# Patient Record
Sex: FEMALE | Race: BLACK OR AFRICAN AMERICAN | NOT HISPANIC OR LATINO | URBAN - METROPOLITAN AREA
[De-identification: names, ages, dates, MRNs, and addresses within clinical notes are randomized per-mention and may not be internally consistent; named-entity substitution may affect disease eponyms.]

---

## 2018-02-27 ENCOUNTER — HISTORICAL (OUTPATIENT)
Dept: ADMINISTRATIVE | Facility: HOSPITAL | Age: 18
End: 2018-02-27

## 2018-09-13 ENCOUNTER — HISTORICAL (OUTPATIENT)
Dept: ADMINISTRATIVE | Facility: HOSPITAL | Age: 18
End: 2018-09-13

## 2018-09-13 LAB
ABS NEUT (OLG): 4.18 X10(3)/MCL (ref 2.1–9.2)
ALBUMIN SERPL-MCNC: 3.2 GM/DL (ref 3.4–5)
ALBUMIN/GLOB SERPL: 0.8 RATIO (ref 1.1–2)
ALP SERPL-CCNC: 39 UNIT/L (ref 38–126)
ALT SERPL-CCNC: 15 UNIT/L (ref 8–29)
AST SERPL-CCNC: 18 UNIT/L (ref 15–37)
B-HCG FREE SERPL-ACNC: <1 MIU/ML
BASOPHILS # BLD AUTO: 0 X10(3)/MCL (ref 0–0.2)
BASOPHILS NFR BLD AUTO: 0 %
BILIRUB SERPL-MCNC: 0.4 MG/DL (ref 0.2–1)
BILIRUBIN DIRECT+TOT PNL SERPL-MCNC: 0.1 MG/DL (ref 0–0.5)
BILIRUBIN DIRECT+TOT PNL SERPL-MCNC: 0.3 MG/DL (ref 0–0.8)
BNP BLD-MCNC: 32 PG/ML (ref 0–125)
BUN SERPL-MCNC: 12 MG/DL (ref 7–18)
CALCIUM SERPL-MCNC: 8.9 MG/DL (ref 8.5–10.1)
CHLORIDE SERPL-SCNC: 108 MMOL/L (ref 98–107)
CO2 SERPL-SCNC: 27 MMOL/L (ref 21–32)
CREAT SERPL-MCNC: 0.8 MG/DL (ref 0.55–1.02)
DEPRECATED CALCIDIOL+CALCIFEROL SERPL-MC: 32.39 NG/ML (ref 30–80)
EOSINOPHIL # BLD AUTO: 0.1 X10(3)/MCL (ref 0–0.9)
EOSINOPHIL NFR BLD AUTO: 1 %
ERYTHROCYTE [DISTWIDTH] IN BLOOD BY AUTOMATED COUNT: 12.7 % (ref 11.5–17)
ERYTHROCYTE [SEDIMENTATION RATE] IN BLOOD: 11 MM/HR (ref 0–20)
EST. AVERAGE GLUCOSE BLD GHB EST-MCNC: 103 MG/DL
FERRITIN SERPL-MCNC: 15 NG/ML (ref 8–388)
FOLATE SERPL-MCNC: 31.8 NG/ML (ref 3.1–17.5)
GLOBULIN SER-MCNC: 3.8 GM/DL (ref 2.4–3.5)
GLUCOSE SERPL-MCNC: 98 MG/DL (ref 74–106)
HBA1C MFR BLD: 5.2 % (ref 4.2–6.3)
HCT VFR BLD AUTO: 40.3 % (ref 37–47)
HGB BLD-MCNC: 12.9 GM/DL (ref 12–16)
IRON SATN MFR SERPL: 36 % (ref 20–50)
IRON SERPL-MCNC: 142 MCG/DL (ref 50–175)
LYMPHOCYTES # BLD AUTO: 2.1 X10(3)/MCL (ref 0.6–4.6)
LYMPHOCYTES NFR BLD AUTO: 31 %
MCH RBC QN AUTO: 28.2 PG (ref 27–31)
MCHC RBC AUTO-ENTMCNC: 32 GM/DL (ref 33–36)
MCV RBC AUTO: 88.2 FL (ref 80–94)
MONOCYTES # BLD AUTO: 0.4 X10(3)/MCL (ref 0.1–1.3)
MONOCYTES NFR BLD AUTO: 6 %
NEUTROPHILS # BLD AUTO: 4.18 X10(3)/MCL (ref 1.4–7.9)
NEUTROPHILS NFR BLD AUTO: 62 %
PLATELET # BLD AUTO: 214 X10(3)/MCL (ref 130–400)
PMV BLD AUTO: 9.9 FL (ref 9.4–12.4)
POTASSIUM SERPL-SCNC: 4.2 MMOL/L (ref 3.5–5.1)
PROT SERPL-MCNC: 7 GM/DL (ref 6.4–8.2)
RBC # BLD AUTO: 4.57 X10(6)/MCL (ref 4.2–5.4)
RET# (OHS): 0.07 X10^6/ML (ref 0.02–0.08)
RETICULOCYTE COUNT AUTOMATED (OLG): 1.6 % (ref 1.1–2.1)
RHEUMATOID FACT SERPL-ACNC: <10 IU/ML (ref 0–15)
SODIUM SERPL-SCNC: 141 MMOL/L (ref 136–145)
T3RU NFR SERPL: 24 % (ref 31–39)
T4 FREE SERPL-MCNC: 0.96 NG/DL (ref 0.76–1.46)
TIBC SERPL-MCNC: 394 MCG/DL (ref 250–450)
TRANSFERRIN SERPL-MCNC: 302 MG/DL (ref 200–360)
TSH SERPL-ACNC: 2.32 MIU/L (ref 0.36–3.74)
VIT B12 SERPL-MCNC: 221 PG/ML (ref 193–986)
WBC # SPEC AUTO: 6.8 X10(3)/MCL (ref 4.5–11.5)

## 2019-03-07 ENCOUNTER — HISTORICAL (OUTPATIENT)
Dept: CARDIOLOGY | Facility: HOSPITAL | Age: 19
End: 2019-03-07

## 2019-03-21 LAB
ALBUMIN SERPL-MCNC: 3.3 GM/DL (ref 3.4–5)
ALBUMIN/GLOB SERPL: 0.9 RATIO (ref 1.1–2)
ALP SERPL-CCNC: 38 UNIT/L (ref 38–126)
ALT SERPL-CCNC: 17 UNIT/L (ref 8–29)
AST SERPL-CCNC: 13 UNIT/L (ref 15–37)
BILIRUB SERPL-MCNC: 0.1 MG/DL (ref 0.2–1)
BILIRUBIN DIRECT+TOT PNL SERPL-MCNC: 0 MG/DL (ref 0–0.8)
BILIRUBIN DIRECT+TOT PNL SERPL-MCNC: 0.1 MG/DL (ref 0–0.5)
BUN SERPL-MCNC: 11 MG/DL (ref 7–18)
CALCIUM SERPL-MCNC: 8.5 MG/DL (ref 8.5–10.1)
CHLORIDE SERPL-SCNC: 108 MMOL/L (ref 98–107)
CHOLEST SERPL-MCNC: 163 MG/DL (ref 0–200)
CHOLEST/HDLC SERPL: 2.6 {RATIO} (ref 0–4)
CO2 SERPL-SCNC: 27 MMOL/L (ref 21–32)
CREAT SERPL-MCNC: 0.71 MG/DL (ref 0.55–1.02)
ERYTHROCYTE [DISTWIDTH] IN BLOOD BY AUTOMATED COUNT: 13.2 % (ref 11.5–17)
EST. AVERAGE GLUCOSE BLD GHB EST-MCNC: 105 MG/DL
GLOBULIN SER-MCNC: 3.6 GM/DL (ref 2.4–3.5)
GLUCOSE SERPL-MCNC: 90 MG/DL (ref 74–106)
HBA1C MFR BLD: 5.3 % (ref 4.2–6.3)
HCT VFR BLD AUTO: 41.4 % (ref 37–47)
HDLC SERPL-MCNC: 63 MG/DL (ref 35–60)
HGB BLD-MCNC: 13.1 GM/DL (ref 12–16)
INR PPP: 0.9 (ref 0–1.3)
LDLC SERPL CALC-MCNC: 83 MG/DL (ref 0–129)
MAGNESIUM SERPL-MCNC: 1.9 MG/DL (ref 1.8–2.4)
MCH RBC QN AUTO: 27.9 PG (ref 27–31)
MCHC RBC AUTO-ENTMCNC: 31.6 GM/DL (ref 33–36)
MCV RBC AUTO: 88.1 FL (ref 80–94)
PLATELET # BLD AUTO: 204 X10(3)/MCL (ref 130–400)
PMV BLD AUTO: 10.3 FL (ref 9.4–12.4)
POTASSIUM SERPL-SCNC: 3.8 MMOL/L (ref 3.5–5.1)
PROT SERPL-MCNC: 6.9 GM/DL (ref 6.4–8.2)
PROTHROMBIN TIME: 12.6 SECOND(S) (ref 12–14)
RBC # BLD AUTO: 4.7 X10(6)/MCL (ref 4.2–5.4)
SODIUM SERPL-SCNC: 141 MMOL/L (ref 136–145)
TRIGL SERPL-MCNC: 86 MG/DL (ref 30–150)
TSH SERPL-ACNC: 2.23 MIU/L (ref 0.36–3.74)
VLDLC SERPL CALC-MCNC: 17 MG/DL
WBC # SPEC AUTO: 8.6 X10(3)/MCL (ref 4.5–11.5)

## 2019-03-22 ENCOUNTER — HISTORICAL (OUTPATIENT)
Dept: CARDIOLOGY | Facility: HOSPITAL | Age: 19
End: 2019-03-22

## 2019-08-09 ENCOUNTER — HISTORICAL (OUTPATIENT)
Dept: CARDIOLOGY | Facility: HOSPITAL | Age: 19
End: 2019-08-09

## 2021-04-09 ENCOUNTER — HISTORICAL (OUTPATIENT)
Dept: ADMINISTRATIVE | Facility: HOSPITAL | Age: 21
End: 2021-04-09

## 2022-04-04 ENCOUNTER — HISTORICAL (OUTPATIENT)
Dept: ADMINISTRATIVE | Facility: HOSPITAL | Age: 22
End: 2022-04-04

## 2022-04-05 ENCOUNTER — HISTORICAL (OUTPATIENT)
Dept: ENDOSCOPY | Facility: HOSPITAL | Age: 22
End: 2022-04-05

## 2022-04-10 ENCOUNTER — HISTORICAL (OUTPATIENT)
Dept: ADMINISTRATIVE | Facility: HOSPITAL | Age: 22
End: 2022-04-10

## 2022-04-24 VITALS
BODY MASS INDEX: 31.36 KG/M2 | DIASTOLIC BLOOD PRESSURE: 78 MMHG | WEIGHT: 224 LBS | HEIGHT: 71 IN | SYSTOLIC BLOOD PRESSURE: 117 MMHG

## 2022-04-30 NOTE — OP NOTE
Patient:   Princess Radford             MRN: [id number]            FIN: [id number]               Age:   19 years     Sex:  Female     :  2000   Associated Diagnoses:   Encounter for loop recorder at end of battery life; Presence of electronic cardiac device; Palpitations   Author:   Ok Barlow DO      Brief Operative Note   Operative Information   Date/ Time:  2019 13:50:00.     Preoperative Diagnosis: Encounter for loop recorder at end of battery life (ARK81-PY Z45.09), Palpitations (RBZ52-MN R00.2), Presence of electronic cardiac device (QED42-HF Z95.818).     Postoperative Diagnosis: Encounter for loop recorder at end of battery life (CCC92-ST Z45.09), Palpitations (MZG03-FD R00.2), Presence of electronic cardiac device (EQO86-LO Z95.818).     Procedures Performed: Explant of Medtronic ILR(LINQ)   .     Surgeon: Ok Barlow DO     Esimated blood loss: No blood loss.     Description of Procedure/Findings/    Complications: See op report on chart.   .

## 2022-04-30 NOTE — OP NOTE
DATE OF SURGERY:    08/09/2019    SURGEON:  Ok Barlow DO    PREOPERATIVE DIAGNOSES:    1. Palpitations.  2. Dizziness.    3. History of subcutaneous cardiac monitor.    POSTOPERATIVE DIAGNOSES:    1. Palpitations.  2. Dizziness.    3. History of subcutaneous cardiac monitor.    PROCEDURES:  Explantation of the Medtronic LINQ implantable loop recorder/subcutaneous cardiac monitor.    COMPLICATIONS:  None.    ESTIMATED BLOOD LOSS:  None.    HISTORY:  Mrs. Radford was brought to the cardiovascular short-stay unit, where under sterile surgical technique, the Medtronic LINQ was explanted from the left parasternal location.  The patient had opted to have the device removed only after having it for a few months.  The site was infiltrated with lidocaine, and then the device easily removed from the pocket.  The wound was closed with Vicryl suture and Dermabond, and the patient otherwise tolerated the procedure well and was ready for discharge home.        ______________________________  Ok Barlow DO    CT/UG  DD:  08/09/2019  Time:  09:07PM  DT:  08/09/2019  Time:  09:24PM  Job #:  [id number]

## 2022-04-30 NOTE — DISCHARGE SUMMARY
DISCHARGE DATE:  03/22/2019    HOSPITAL COURSE:  Ms. Radford was admitted for elective implantation of the Medtronic LINQ implantable loop recorder.  The Medtronic LINQ implantable loop recorder was successfully completed in the CV short-stay department earlier today.  As outlined in the operative report, the device was successfully implanted and appears to be functioning normally.  Postoperative course has been unremarkable, allowing for discharge home today.    DISCHARGE DIAGNOSES:    1. Syncope.   2. Presyncope.  3. Palpitations.  4. Status post implantation of the Medtronic LINQ implantable loop recorder.    RECOMMENDATIONS:  Ms. Radford will be arranged for discharge home today if she remains in stable condition.  I have asked her to call or return should she have any further problems or complaints.  In the meantime, she will continue on the same medications as on admission; and in addition, she is being placed on empiric Cipro 250 mg twice daily for 1 week and Motrin 600 mg q.4 h. as needed for pain.  She is given wound care instructions and also instructions on how to use her implantable recorder.  I will be checking on her progress in my office in the very near future.  Further recommendations forthcoming.        ______________________________  Ok Barlow DO    CT/UR  DD:  03/22/2019  Time:  06:06PM  DT:  03/22/2019  Time:  06:38PM  Job #:  [id number]    cc: Ok Barlow DO

## 2022-04-30 NOTE — DISCHARGE SUMMARY
DISCHARGE DATE:  08/09/2019    HOSPITAL COURSE:  Mrs. Radford was admitted for elective explantation of the Medtronic LINQ implantable subcutaneous cardiac monitor.  This was successfully completed earlier today.  She tolerated the procedure well and is ready for discharge home.    DISCHARGE DIAGNOSES:    1. Palpitations.  2. Dizziness.  3. History of cardiac arrhythmias.  4. Status post explantation of the Medtronic LINQ implantable loop recorder/subcutaneous cardiac monitor.  5. Anxiety.    RECOMMENDATIONS:  Mrs. Radford will be arranged for discharge home to continue on the same medications as on admission along with empiric Cipro for approximately 5 days.  She will also be on p.r.n. Motrin for discomfort.  She will be given wound care instructions and how to care for her wound and I have asked her to call or return should she have any further problems or complaints.  I will be checking on her progress in the office in the very near future.  We will continue to monitor her progress closely and titrate her medical therapies as tolerated.  Further recommendations forthcoming.        ______________________________  DO MAGALY Jim/DELFINO  DD:  08/09/2019  Time:  09:08PM  DT:  08/09/2019  Time:  09:37PM  Job #:  [id number]    cc: Ok Barlow DO

## 2022-04-30 NOTE — OP NOTE
Patient:   Princess Radford             MRN: [id number]            FIN: [id number]               Age:   18 years     Sex:  Female     :  2000   Associated Diagnoses:   Dizzy; Dysrhythmia; Palpitations; Sustained SVT; Syncope   Author:   Ok Barlow DO      Brief Operative Note   Operative Information   Date/ Time:  3/22/2019 08:42:00.     Preoperative Diagnosis: Dizzy (ECZ66-YJ R42), Dysrhythmia (OQM97-VC I49.9), Palpitations (AKA64-YW R00.2), Sustained SVT (URM51-HI I47.1), Syncope (DHC23-OQ R55).     Postoperative Diagnosis: Dizzy (VNL47-NP R42), Dysrhythmia (WJK09-MB I49.9), Palpitations (QGR40-GN R00.2), Sustained SVT (GWZ96-WA I47.1), Syncope (LJZ55-HO R55).     Procedures Performed: Implantation of Medtronic ILR - LINQ   .     Surgeon: Ok Barlow DO     Esimated blood loss: No blood loss.     Description of Procedure/Findings/    Complications: See op report on chart.   .

## 2022-05-03 NOTE — HISTORICAL [facility] CERNER
This is a historical note converted from Lauro. Formatting and pictures may have been removed.  Please reference Lauro for original formatting and attached multimedia. Chief Complaint  Bilateral Knee/Ankle Pain  History of Present Illness  17?yo?female?non-smoker?presents to ortho clinic for?initial visit?for?right?anklepain.? Patient points to ?medial ankle.  Onset:??approximately 3?years progressively?getting worse?over time  Current pain level: 5/10 (rated as?moderate) ?without pain medication. Quality described as?sharp?Patient reports taking?OTC?PRN pain with?no?relief.? Patient?denies?use of pain medications today.  Modifying Factors: ?Worse with/after activity;Improved with rest;??Pain with first step in the morning; increased pain with prolonged standing/walking/running; no radiation; pain increases with walking up stairs.?  Previous treatment:?None  Previous injuries:?Denies  Associated Symptoms:?Crepitus/Grinding; ?No numbness or tingling;??No swelling;?No skin changes;?No weakness;?No decrease in ROM  Activity:?Sedentary, full ADLs;?Pain does not interfere with ADLs (none)?Patient?denies?use of assistive devices?for ambulation.?  Family History:?Family history of arthritis  ?  Patient is active 18 yo who has been playing softball since 4 yo.? as a catcher.  Review of Systems  Constitutional:No fever;No chills;No weight loss  CV:No swelling;No edema  GI:No urinary retention;No urinary incontinence  :No fecal incontinence  Skin:No rash;No wound  Neuro:No numbness/tingling;No weakness;No saddle anesthesia  MSK: As above  Psych:No depression;No anxiety  Heme/Lymph:No easy bruising;No easy bleeding;No lymphadenopathy  Immuno:No MRSA history  Physical Exam  Vitals & Measurements  BP:?121/79?  HT:?180?cm? HT:?180?cm? WT:?79.1?kg? WT:?79.1?kg? BMI:?24.41?  MSK: ?Rightankle  General: No apparent distress;?obese  Inspection:?Normal gait/ station;?full weight bearing;??normal alignment;??no swelling;?no  erythema;?No contusion;?no atrophy / deconditioning noted  Palpation:?TTP ATFL?tendon moderate; ?Crepitus:?Negative;?Normal temperature; Pain with passive dorsiflexion of bilateral toes  ROM:good, without limitation  Neurovascular:?Intact; 2+?distal pulse, sensation intact to light touch  Neuro/Psych: Awake, Alert, Oriented; Normal mood and affect  Lymphatic: No LAD  Skin and Soft Tissue: No bruising, No ecchymosis; No rash; Skin intact  Assessment/Plan  1.?Sprain of anterior talofibular ligament of right ankle  ?1.? Discussed with patient diagnosis and treatment recommendations.? Handout given.  2.? Imaging:?Radiological studies ordered and independently reviewed; Discussed with patient;  3.? Treatment plan:HEP ankle conditioning; right air ankle splint  4.? Procedure:?Patient is not a candidate for CSI  5.? Activity:?Activity as tolerated; HEP to included aerobic conditioning with non-painful activity and ROM/STG exercises;?avoid walking barefoot;  6.? Therapy:Formal PT/OT ordered  7.? Medication:?First line treatment with?OTC NSAIDs PRN pain as directed on label; Medication precautions given  8.? RTC:?6 weeks  9.? Additional work-up:?None  Ordered:  Clinic Follow up, *Est. 04/10/18 3:00:00 CDT, Order for future visit, Sprain of anterior talofibular ligament of right ankle, [facility]  Office/Outpatient Visit Level 4 Established [id number] PC, Sprain of anterior talofibular ligament of right ankle, [facility], 02/27/18 12:15:00 CST  ?  2.?Mild obesity  Patient educated that diet modifications with low impact exercises as tolerated for reduction in BMI would improve overall treatment and outcome.  ?   Problem List/Past Medical History  Ongoing  Mild obesity  Historical  No qualifying data  Medications  Tri-Sprintec, 1 tab(s), Oral, Daily  Allergies  No Known Medication Allergies  Social History  Alcohol - No Risk, 04/22/2015  Never, 02/27/2018  Substance Abuse - No Risk, 04/22/2015  Never, 02/27/2018  Tobacco -  No Risk, 04/22/2015  Never smoker, 02/27/2018  Diagnostic Results  02/27/2018 10:37 CST XR Ankle Right Minimum 3 Views  HISTORY: Pain.  FINDINGS: Examination reveals normal mineralization of the visualized  osseous structures articular spaces are essentially preserved with  smooth articular surfaces no acute fractures or dislocations  identified no other bony pathology seen.  IMPRESSION: No significant abnormality identified     [1]?XR Ankle Right Minimum 3 Views; Rober Gallo MD 02/27/2018 10:37 CST

## 2022-05-03 NOTE — HISTORICAL [facility] CERNER
This is a historical note converted from Lauro. Formatting and pictures may have been removed.  Please reference Lauro for original formatting and attached multimedia. Chief Complaint  right ankle  History of Present Illness  20 Years?old ?RHD?Female?non-smoker?presents to?sports medicine clinic?for?initial evaluation?for right ankle pain.  ?  Patient states?for the past 2 months has been having lateral ankle pain, which is worsening. Has swelling which fluctuates, but never normalizes. Was told to stay off of it for a few days which helps only temporarily. 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 At work she is wearing Ballard without any inserts. Has been getting back in to the gym recently, does not recall any specific activity that bothered her immediately.  ?   Previously seen walk-in clinic, PCP.  Previous treatment:?none  previous injuries:?in high school developed a distal fibula fracture while playing softball, treated initially as a tendonitis by her .  Current pain level: 4-9/10 ( rated as?moderate)?medication not helping?has tried NSAIDs, Tylenol icing.  associated symptoms:?no numbness and tingling;?swelling;?no skin changes;?no weakness;?no decrease in ROM  Current activity level: Works as an online   Family history:?non contributory  Review of Systems  10 review of systems negative except as stated in HPI  Physical Exam  Vitals & Measurements  HR:?92(Peripheral)? BP:?117/78?  HT:?180.00?cm? WT:?101.600?kg? BMI:?31.36? LMP:?03/25/2021 00:00 CDT?  General: well developed; well nourished; cooperative  PSYCH: alert and oriented with?appropriate mood and affect  SKIN: inspection and palpation of skin and soft tissue normal; no scars noted on upper/lower extremities  CV: vascular integrity noted; +2 symmetrical pulses, no edema  PULM: nonlabored breathing, no accessory muscle use  NEURO: sensation intact by light touch; DTRs +2 bilateral and symmetrical  LYMPH: no LAD noted  ?  Ankle  MSK?  ?Right?ankle?  ?  Inspection:?  Gait:?Normal, FWB,?no swelling,?no erythema,?no bruising?  Current foot ware:?flip flops?  Pes cavus, ?varus hindfoot  Palpation:?tender along?posterior?lateral malleolus?along tract?of peroneal?tendons?  ?  ROM?  Plantarflexion (0-40): 40  Dorsiflexion (0-20):20  Pronation (0-20): 10  Supination (0-30):?40  ?  Strength  Plantarflexion: 5/5  Dorsiflexion: 5/5  Inversion: 5/5  Eversion: 4/5 with pain  ?  Neurovascular?  pulses 2+ distally, sensation intact?  ?  Special tests?  Bobby:?negative?  Anterior drawer:?negative?  Talar tilt:?negative?  Tinels:?negative?  Squeeze:?negative?  Assessment/Plan  1.?Peroneal tendinitis?M76.70  ?Plan:?Patient with initial conservative management at this time.discussed treatment options with patient. Recommending lace up ankle brace for comfort. Discussed HEP. Using topical anti-inflammatories.  Rads:?imaging ordered and independently reviewed by me, discussed with patient, radiologist read pending  Immobilization:?lace up?ankle brace?provided. If not improved with this, she can look in to getting a Trilock ankle brace  Therapy:?HEP  Rx:?Script given?for topical?diclofenac  Work-up:?no additional imaging needed at this time  Activity:?as tolerated  RTC:?2 months?if not improved. 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 Recommend going to an athletic shop to be fitted and evaluated  3.?Body mass index 30+ - obesity?Z68.30  ?- hand out provided for tips on weight loss  Orders:  diclofenac topical, 2 gm =, TOP, QID, PRN PRN as needed for pain, # 100 gm, 2 Refill(s), Pharmacy: [facility], 180, cm, Height/Length Dosing, 04/09/21 10:07:00 CDT, 101.6, kg, Weight Dosing, 04/09/21 10:07:00 CDT  XR Foot Right Minimum 3 Views, Routine, 04/09/21 10:09:00 CDT, Pain, None, Patient Bed, Rad Type, Foot pain, right,  Not Scheduled, 04/09/21 10:09:00 CDT  ?  Faculty Attestation:?Princess Radford?was seen in?Sports Medicine Clinic.??Discussed with  at the time of the visit.?History of Present Illness, Physical Exam, and Assessment and Plan reviewed. Treatment plan is reasonable and appropriate. 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

## 2022-05-14 NOTE — OP NOTE
Patient:   Princess Radford            MRN: [id number]            FIN: [id number]               Age:   21 years     Sex:  Female     :  2000   Associated Diagnoses:   None   Author:   Randolph Skelton MD      Operative Note   Operative Information   Date/ Time:  2022 08:27:00.     Procedures Performed: EGD with biopsy.     Preoperative Diagnosis: Right upper quadrant pain, nausea, heartburn, bloating.     Postoperative Diagnosis: Normal EGD.  Antral biopsies were done..     Surgeon: Randolph Skelton MD.     Assistant: GI staff.     Anesthesia: per anaesthesia service.     Speciman Removed: Yes.     Esimated blood loss: loss  2  cc.     Description of Procedure/Findings/    Complications: History and physical as per pre-operative note. Informed consent was obtained. Patient was placed in left lateral position. Sedation per anaesthesia service. Olympus video gastroscope was introduced into the oral cavity and esophagus was intubated under direct visualization. The scope was carefully advanced to the distal duodenum. The patient tolerated the procedure well without any complications. .     Findings: Esophagus: Normal mucosa.  GE junction at approximately 40 cm.  Stomach: Fundus, cardia, body and antrum appeared unremarkable.  Antral biopsies were obtained.  Duodenum: Duodenal bulb, 2nd and 3rd portion of the duodenum appeared unremarkable to the extent of exam..     Complications: None.     Recommendations:  1.  Follow biopsy results.  2.  H2 blockers or PPI as needed.  3.  Need to obtain [facility] from recent emergency room visit.  Patient has questionable history of cholelithiasis.  4.  May need surgical evaluation if right upper quadrant pain persist..

## 2024-07-24 PROCEDURE — 87491 CHLMYD TRACH DNA AMP PROBE: CPT | Performed by: OBSTETRICS & GYNECOLOGY

## 2024-07-24 PROCEDURE — 87624 HPV HI-RISK TYP POOLED RSLT: CPT | Performed by: OBSTETRICS & GYNECOLOGY

## 2024-07-24 PROCEDURE — 87591 N.GONORRHOEAE DNA AMP PROB: CPT | Performed by: OBSTETRICS & GYNECOLOGY

## 2024-07-24 PROCEDURE — 87661 TRICHOMONAS VAGINALIS AMPLIF: CPT | Performed by: OBSTETRICS & GYNECOLOGY
